# Patient Record
Sex: FEMALE | Race: WHITE | NOT HISPANIC OR LATINO | Employment: OTHER | URBAN - METROPOLITAN AREA
[De-identification: names, ages, dates, MRNs, and addresses within clinical notes are randomized per-mention and may not be internally consistent; named-entity substitution may affect disease eponyms.]

---

## 2019-06-10 ENCOUNTER — OFFICE VISIT (OUTPATIENT)
Dept: URGENT CARE | Facility: URGENT CARE | Age: 69
End: 2019-06-10
Payer: MEDICARE

## 2019-06-10 VITALS
DIASTOLIC BLOOD PRESSURE: 42 MMHG | WEIGHT: 133.7 LBS | TEMPERATURE: 97.7 F | OXYGEN SATURATION: 100 % | HEART RATE: 74 BPM | SYSTOLIC BLOOD PRESSURE: 141 MMHG

## 2019-06-10 DIAGNOSIS — S61.412A LACERATION OF LEFT PALM, INITIAL ENCOUNTER: Primary | ICD-10-CM

## 2019-06-10 PROCEDURE — 12001 RPR S/N/AX/GEN/TRNK 2.5CM/<: CPT | Performed by: PHYSICIAN ASSISTANT

## 2019-06-10 RX ORDER — FLUTICASONE PROPIONATE 50 MCG
1 SPRAY, SUSPENSION (ML) NASAL DAILY
COMMUNITY

## 2019-06-10 RX ORDER — MONTELUKAST SODIUM 10 MG/1
10 TABLET ORAL AT BEDTIME
COMMUNITY

## 2019-06-10 NOTE — PROGRESS NOTES
SUBJECTIVE:     Chief Complaint   Patient presents with     Urgent Care     Lesion     cut with cutting watermelon near pinky on left hand.      Leeann Kevin is a 68 year old female who presents to the clinic with a laceration on the left hand sustained just prior to arrival. This is a non-work related injury.    Mechanism of injury: Patient was cutting watermelon and sliced her hand.    Associated symptoms: Denies numbness, weakness, or loss of function  Last tetanus booster within 10 years: yes    EXAM:   The patient appears today in alert,no apparent distress distress  VITALS: /42   Pulse 74   Temp 97.7  F (36.5  C) (Oral)   Wt 60.6 kg (133 lb 11.2 oz)   SpO2 100%     Size of laceration: 2 centimeters  Characteristics of the laceration: bleeding- mild  Tendon function intact: yes  Sensation to light touch intact: yes  Pulses intact: yes  Picture included in patient's chart: no    Assessment / Plan:  1. Laceration of left palm, initial encounter  - REPAIR SUPERFICIAL, WOUND BODY < =2.5CM    PROCEDURE NOTE::  Wound was locally injected with 3 cc's of Lidocaine 2% plain  Prepped and draped in the usual sterile fashion  Wound soaked  Laceration was closed using 3 4-0 nylon interrupted sutures  After care instructions:  Keep wound clean and dry for the next 24-48 hours  Signs of infection discussed today  Apply anti-bacterial ointment for 7-10 days    Whitney Martin PA-C

## 2019-06-10 NOTE — PATIENT INSTRUCTIONS
Patient Education      * Laceration (All Closures)  Haves sutures removed in 10 days  A laceration is a cut through the skin. This will usually require stitches (sutures) or staples if it is deep. Minor cuts may be treated with a tape closure ( Steri-Strips ) or Dermabond skin glue.  Home Care:  PAIN MEDICINE: You may use acetaminophen (Tylenol) 650 1000 mg every 6 hours or ibuprofen (Motrin, Advil) 600 mg every 6 8 hours with food to control pain, if you are able to take these medicines. [NOTE: If you have chronic liver or kidney disease or ever had a stomach ulcer or GI bleeding, talk with your doctor before using these medicines.]  EXTREMITY, FACE or TRUNK WOUNDS:    Keep the wound clean and dry. If a bandage was applied and it becomes wet or dirty, replace it. Otherwise, leave it in place for the first 24 hours.    If stitches or staples were used, clean the wound daily. Protect the wound from sunlight and tanning lamps.    After removing the bandage, wash the area with soap and water. Use a wet cotton swab (Q tip) to loosen and remove any blood or crust that forms.    After cleaning, apply a thin layer of Polysporin or Bacitracin ointment. This will keep the wound clean and make it easier to remove the stitches or staples. Reapply a fresh bandage.    You may remove the bandage to shower as usual after the first 24 hours, but do not soak the area in water (no swimming) until the stitches or staples are removed.    If Steri-Strips were used, keep the area clean and dry. If it becomes wet, blot it dry with a towel. It is okay to take a brief shower, but avoid scrubbing the area.    If Dermabond skin adhesive was used, do not scratch, rub or pick at the adhesive film. Do not place tape directly over the film. Do not apply liquid, ointment or creams to the wound while the film is in place. Do not clean the wound with peroxide and do not apply ointments. Avoid activities that cause heavy sweating until the film has  fallen off. Protect the wound from prolonged exposure to sunlight or tanning lamps. You may shower as usual but do not soak the wound in water (no baths or swimming). The film will fall off by itself in 5-10 days.  SCALP WOUNDS: During the first two days, you may carefully rinse your hair in the shower to remove blood, glass or dirt particles. After two days, you may shower and shampoo your hair normally. Do not soak your scalp in the tub or go swimming until the stitches or staples have been removed.  MOUTH WOUNDS: Eat soft foods to reduce pain. If the cut is inside of your mouth, clean by rinsing after each meal and at bedtime with a mixture of equal parts water and Hydrogen Peroxide (do not swallow!). Or, you can use a cotton swab to directly apply Hydrogen Peroxide onto the cut.  After the wound is done healing, use sunscreen over the area whenever exposed for the next 6 minths to avoid a darker scar.  Follow Up:  Most skin wounds heal within ten days. Mouth and facial wounds heal within five days. However, even with proper treatment, a wound infection may sometimes occur. Therefore, you should check the wound daily for signs of infection listed below.  Stitches should be removed from the face within five days; stitches and staples should be removed from other parts of the body within 7-10 days. Unless you are told to come back to the emergency room, you may have your doctor or urgent care remove the stitches. If dissolving stitches were used in the mouth, these will fall out or dissolve without the need for removal. If tape closures ( Steri-Strips ) were used, remove them yourself if they have not fallen off after 7 days. If Dermabond skin glue was used, the film will fall off by itself in 5-10 days.   Get Prompt Medical Attention  if any of the following occur:    Increasing pain in the wound    Redness, swelling or pus coming from the wound    Fever over 101 F (38.3 C) oral    If stitches or staples come  apart or fall out or if Steri-Strips fall off before seven days    If the wound edges re-open    Bleeding not controlled by direct pressure    2236-8653 [employer]. [address] [place] [address], [place] [address]. All rights reserved. This information is not intended as a substitute for professional medical care. Always follow your healthcare professional's instructions.  This information has been modified by your health care provider with permission from the publisher.  Modifications clinically reviewed by Dr. Sanket Samson on 7/20/18.

## 2019-06-19 ENCOUNTER — OFFICE VISIT (OUTPATIENT)
Dept: URGENT CARE | Facility: URGENT CARE | Age: 69
End: 2019-06-19
Payer: MEDICARE

## 2019-06-19 VITALS
DIASTOLIC BLOOD PRESSURE: 52 MMHG | OXYGEN SATURATION: 98 % | RESPIRATION RATE: 20 BRPM | TEMPERATURE: 97.8 F | HEART RATE: 70 BPM | WEIGHT: 133 LBS | SYSTOLIC BLOOD PRESSURE: 138 MMHG

## 2019-06-19 DIAGNOSIS — Z48.02 VISIT FOR SUTURE REMOVAL: Primary | ICD-10-CM

## 2019-06-19 PROCEDURE — 99024 POSTOP FOLLOW-UP VISIT: CPT

## 2019-06-19 NOTE — PROGRESS NOTES
Subjective:   Leeann Kevin is a 68 year old female who presents for   Chief Complaint   Patient presents with     Urgent Care     stitches removed from L hand      3 sutures placed on 6/10/19 with [place] urgent [facility]  No redness, numbness, drainage experienced. Patient has intact function of this hand.     There are no active problems to display for this patient.      Current Outpatient Medications   Medication     fluticasone (FLONASE) 50 MCG/ACT nasal spray     LOSARTAN POTASSIUM PO     montelukast (SINGULAIR) 10 MG tablet     No current facility-administered medications for this visit.        ROS:  As above per HPI    Objective:   /52   Pulse 70   Temp 97.8  F (36.6  C)   Resp 20   Wt 60.3 kg (133 lb)   SpO2 98% , There is no height or weight on file to calculate BMI.  Gen:  NAD, well-nourished, sitting in chair comfortably  HEENT: EOMI, sclera anicteric, Head normocephalic, ; nares patent; moist mucous membranes  Neck: trachea midline, no thyromegaly  L hand: intact movement of all fingers, no numbness, 3 interrupted sutures without redness/drainage/tenderness of the area of laceration  CV:  Hemodynamically stable  L        No results found for this or any previous visit.    Assessment & Plan:   Leeann Kevin, 68 year old female who presents with:    Visit for suture removal  9 days since placement. Okay to remove, no complications. Discussed continuing to apply ointment or vaseline to keep area moist. F/u if having complications/redness/signs of infection.       Curtis Bolivar MD   [facility]    The use of Dragon/[employer] dictation services may have been used to construct the content in this note; any grammatical or spelling errors are non-intentional. Please contact the author of this note directly if you are in need of any clarification.

## 2023-08-09 ENCOUNTER — OFFICE VISIT (OUTPATIENT)
Dept: URGENT CARE | Facility: URGENT CARE | Age: 73
End: 2023-08-09
Payer: MEDICARE

## 2023-08-09 VITALS
RESPIRATION RATE: 20 BRPM | OXYGEN SATURATION: 98 % | DIASTOLIC BLOOD PRESSURE: 93 MMHG | SYSTOLIC BLOOD PRESSURE: 165 MMHG | TEMPERATURE: 98 F | HEART RATE: 78 BPM

## 2023-08-09 DIAGNOSIS — R21 RASH AND NONSPECIFIC SKIN ERUPTION: Primary | ICD-10-CM

## 2023-08-09 PROCEDURE — 99203 OFFICE O/P NEW LOW 30 MIN: CPT | Performed by: PHYSICIAN ASSISTANT

## 2023-08-09 RX ORDER — ALENDRONATE SODIUM 35 MG/1
TABLET ORAL
COMMUNITY
Start: 2023-07-13

## 2023-08-09 RX ORDER — PREDNISONE 10 MG/1
TABLET ORAL
Qty: 9 TABLET | Refills: 0 | Status: SHIPPED | OUTPATIENT
Start: 2023-08-09 | End: 2023-08-15

## 2023-08-09 RX ORDER — TRIAMCINOLONE ACETONIDE 1 MG/G
CREAM TOPICAL 2 TIMES DAILY
Qty: 28 G | Refills: 0 | Status: SHIPPED | OUTPATIENT
Start: 2023-08-09

## 2023-08-09 RX ORDER — ATORVASTATIN CALCIUM 10 MG/1
10 TABLET, FILM COATED ORAL AT BEDTIME
COMMUNITY
Start: 2023-06-01

## 2023-08-09 RX ORDER — LATANOPROST 50 UG/ML
SOLUTION/ DROPS OPHTHALMIC
COMMUNITY
Start: 2023-08-01

## 2023-08-09 NOTE — PATIENT INSTRUCTIONS
Start taking prednisone short taper  Apply triamcinolone to the areas that are very itchy  Return in one week if symptoms do not improve

## 2023-08-09 NOTE — PROGRESS NOTES
Assessment & Plan     1. Rash and nonspecific skin eruption  Unclear etiology of rash, [place]  Start taking prednisone, and treating very itchy areas with triamcinolone.  Discussed with patient my consideration of scabies, I think it is unlikely as her  does not have symptoms but if rash not resolved would consider.  - predniSONE (DELTASONE) 10 MG tablet; Take 2 tablets (20 mg) by mouth daily for 3 days, THEN 1 tablet (10 mg) daily for 3 days.  Dispense: 9 tablet; Refill: 0  - triamcinolone (KENALOG) 0.1 % external cream; Apply topically 2 times daily Spot treat. Do not use for more than 2 weeks at a time.  Dispense: 28 g; Refill: 0        Return in about 1 week (around 8/16/2023), or if symptoms worsen or fail to improve.    Diagnosis and treatment plan was reviewed with patient and/or family.   We went over any labs or imaging. Discussed worsening symptoms or little to no relief despite treatment plan to follow-up with PCP or return to clinic.  Patient verbalizes understanding. All questions were addressed and answered.     Whitney Martin PA-C  [facility]    CHIEF COMPLAINT:   Chief Complaint   Patient presents with    Derm Problem     Rash on arm and legs itchy since      Subjective     Leeann is a 72 year old female who presents to clinic today for evaluation of rash.  Rash has been present for the past 9 days.  Rash is very itchy, and is located on the arms and legs bilaterally.  Has spread somewhat but not significantly.  Her daughter has had a similar rash, which resolves on its own.  No fever or chills.  She has not used anything on the rash.  No new products or detergents.      History reviewed. No pertinent past medical history.  History reviewed. No pertinent surgical history.  Social History     Tobacco Use    Smoking status: Never    Smokeless tobacco: Never   Substance Use Topics    Alcohol use: Not on file      Current Outpatient Medications   Medication    alendronate (FOSAMAX) 35 MG tablet    atorvastatin (LIPITOR) 10 MG tablet    fluticasone (FLONASE) 50 MCG/ACT nasal spray    latanoprost (XALATAN) 0.005 % ophthalmic solution    LOSARTAN POTASSIUM PO    montelukast (SINGULAIR) 10 MG tablet    predniSONE (DELTASONE) 10 MG tablet    triamcinolone (KENALOG) 0.1 % external cream     No current facility-administered medications for this visit.     Allergies   Allergen Reactions    Azithromycin     Penicillins     Sulfa Antibiotics        10 point ROS of systems were all negative except for pertinent positives noted in my HPI.      Exam:   BP (!) 165/93   Pulse 78   Temp 98  F (36.7  C) (Tympanic)   Resp 20   SpO2 98%   Constitutional: healthy, alert and no distress  Head: Normocephalic, atraumatic.  Skin: arms and legs bilaterally have erythematous pinpoint lesions with slight swelling.   Neurologic: Speech clear, gait normal. Moves all extremities.    No results found for any visits on 08/09/23.

## 2023-10-08 ENCOUNTER — HEALTH MAINTENANCE LETTER (OUTPATIENT)
Age: 73
End: 2023-10-08

## 2024-10-01 ENCOUNTER — OFFICE VISIT (OUTPATIENT)
Dept: URGENT CARE | Facility: URGENT CARE | Age: 74
End: 2024-10-01
Payer: COMMERCIAL

## 2024-10-01 VITALS
HEART RATE: 93 BPM | DIASTOLIC BLOOD PRESSURE: 90 MMHG | WEIGHT: 130 LBS | SYSTOLIC BLOOD PRESSURE: 166 MMHG | RESPIRATION RATE: 18 BRPM | OXYGEN SATURATION: 99 % | TEMPERATURE: 98.7 F

## 2024-10-01 DIAGNOSIS — R10.2 VAGINAL PAIN: Primary | ICD-10-CM

## 2024-10-01 DIAGNOSIS — N89.8 VAGINAL IRRITATION: ICD-10-CM

## 2024-10-01 LAB
ALBUMIN UR-MCNC: ABNORMAL MG/DL
APPEARANCE UR: CLEAR
BILIRUB UR QL STRIP: NEGATIVE
CLUE CELLS: ABNORMAL
COLOR UR AUTO: YELLOW
GLUCOSE UR STRIP-MCNC: NEGATIVE MG/DL
HGB UR QL STRIP: NEGATIVE
KETONES UR STRIP-MCNC: NEGATIVE MG/DL
LEUKOCYTE ESTERASE UR QL STRIP: NEGATIVE
NITRATE UR QL: NEGATIVE
PH UR STRIP: 6 [PH] (ref 5–7)
RBC #/AREA URNS AUTO: ABNORMAL /HPF
SP GR UR STRIP: 1.02 (ref 1–1.03)
SQUAMOUS #/AREA URNS AUTO: ABNORMAL /LPF
TRICHOMONAS, WET PREP: ABNORMAL
UROBILINOGEN UR STRIP-ACNC: 0.2 E.U./DL
WBC #/AREA URNS AUTO: ABNORMAL /HPF
WBC'S/HIGH POWER FIELD, WET PREP: ABNORMAL
YEAST, WET PREP: ABNORMAL

## 2024-10-01 PROCEDURE — 87210 SMEAR WET MOUNT SALINE/INK: CPT | Performed by: PHYSICIAN ASSISTANT

## 2024-10-01 PROCEDURE — 81001 URINALYSIS AUTO W/SCOPE: CPT | Performed by: PHYSICIAN ASSISTANT

## 2024-10-01 PROCEDURE — 99213 OFFICE O/P EST LOW 20 MIN: CPT | Performed by: PHYSICIAN ASSISTANT

## 2024-10-01 RX ORDER — TERCONAZOLE 0.4 %
1 CREAM WITH APPLICATOR VAGINAL AT BEDTIME
Qty: 45 G | Refills: 0 | Status: SHIPPED | OUTPATIENT
Start: 2024-10-01 | End: 2024-10-08

## 2024-10-01 NOTE — PATIENT INSTRUCTIONS
(R10.2) Vaginal pain  (primary encounter diagnosis)  Comment:   Plan: Wet prep - Clinic Collect, UA Macroscopic with         reflex to Microscopic and Culture - Lab         Collect, UA Microscopic with Reflex to Culture          (N89.8) Vaginal irritation  Comment: Consistent with yeast.  Plan: terconazole (TERAZOL 7) 0.4 % vaginal cream        Use the Terazol intravaginally at bedtime, and apply excess cream to the outer irritated areas.  Cool compress to the area if needed for irritation.    Follow-up with your primary doctor should symptoms persist or worsen, as sometimes an estrogen deficiency can trigger this irritation as well.

## 2024-10-01 NOTE — PROGRESS NOTES
I will come over for short time on the with the auto pays for your bills at that time.  The soonest that I would be able to be there is about 3:00.  And I can stay until about 4:00.  This way we can get all those done, and then I will come back on Saturday and help to get things packed with.  I will be there on Saturday sometime after 11 AM Patient presents with:  Derm Problem: Vaginal itchy and painful intermittently, but getting worse    (R10.2) Vaginal pain  (primary encounter diagnosis)  Comment:   Plan: Wet prep - Clinic Collect, UA Macroscopic with         reflex to Microscopic and Culture - Lab         Collect, UA Microscopic with Reflex to Culture            (N89.8) Vaginal irritation  Comment: Consistent with yeast.  Plan: terconazole (TERAZOL 7) 0.4 % vaginal cream        Use the Terazol intravaginally at bedtime, and apply excess cream to the outer irritated areas.  Cool compress to the area if needed for irritation.    Follow-up with your primary doctor should symptoms persist or worsen, as sometimes an estrogen deficiency can trigger this irritation as well.      At the end of the encounter, I discussed results, diagnosis, medications. Discussed red flags for immediate return to clinic/ER, as well as indications for follow up if no improvement. Patient understood and agreed to plan. Patient was stable for discharge     If not improving or if condition worsens, follow up with your Primary Care Provider          SUBJECTIVE:   Leeann Kevin is a 74 year old female who presents today with vaginal irritation and itching, onset over a week ago.  Has been trying Vaseline and over-the-counter Monistat for chafing.  Denies any open sores.  Denies any abdominal pain or back pain or urinary frequency.    She will be here in [place] until October when she moves back down to [place].    Her primary doctor in [place] has given her estrogen cream in the past to use in her perineal area, but she is not currently  using this.       No past medical history on file.      Current Outpatient Medications   Medication Sig Dispense Refill    Multiple Vitamins-Iron (DAILY-NALDO/IRON/BETA-CAROTENE) TABS TAKE 1 TABLET BY MOUTH DAILY. (Patient not taking: Reported on 10/19/2020) 30 tablet 7     Social History     Tobacco Use    Smoking status: Never Smoker    Smokeless tobacco: Never Used   Substance Use Topics    Alcohol use: Not on file     Family History   Problem Relation Age of Onset    Diabetes Mother     Diabetes Father          ROS:    10 point ROS of systems including Constitutional, Eyes, Respiratory, Cardiovascular, Gastroenterology, Genitourinary, Integumentary, Muscularskeletal, Psychiatric ,neurological were all negative except for pertinent positives noted in my HPI       OBJECTIVE:  BP (!) 166/90   Pulse 93   Temp 98.7  F (37.1  C)   Resp 18   LMP  (LMP Unknown)   SpO2 99%   Physical Exam:  GENERAL APPEARANCE: healthy, alert and no distress  GU_female: external genitalia normal, vaginal discharge: white and thick.  The labia and clitoris are erythematous without sores.  No vesicles or open wounds.    Results for orders placed or performed in visit on 10/01/24   UA Macroscopic with reflex to Microscopic and Culture - Lab Collect     Status: Abnormal    Specimen: Urine, Midstream   Result Value Ref Range    Color Urine Yellow Colorless, Straw, Light Yellow, Yellow    Appearance Urine Clear Clear    Glucose Urine Negative Negative mg/dL    Bilirubin Urine Negative Negative    Ketones Urine Negative Negative mg/dL    Specific Gravity Urine 1.020 1.003 - 1.035    Blood Urine Negative Negative    pH Urine 6.0 5.0 - 7.0    Protein Albumin Urine Trace (A) Negative mg/dL    Urobilinogen Urine 0.2 0.2, 1.0 E.U./dL    Nitrite Urine Negative Negative    Leukocyte Esterase Urine Negative Negative   UA Microscopic with Reflex to Culture     Status: Abnormal   Result Value Ref Range    RBC Urine 0-2 0-2 /HPF /HPF    WBC Urine 0-5  0-5 /HPF /HPF    Squamous Epithelials Urine Few (A) None Seen /LPF    Narrative    Urine Culture not indicated   Wet prep - Clinic Collect     Status: Abnormal    Specimen: Vagina; Swab   Result Value Ref Range    Trichomonas Absent Absent    Yeast Absent Absent    Clue Cells Absent Absent    WBCs/high power field 2+ (A) None

## 2025-01-12 ENCOUNTER — HEALTH MAINTENANCE LETTER (OUTPATIENT)
Age: 75
End: 2025-01-12